# Patient Record
Sex: MALE | Race: WHITE | Employment: FULL TIME | URBAN - METROPOLITAN AREA
[De-identification: names, ages, dates, MRNs, and addresses within clinical notes are randomized per-mention and may not be internally consistent; named-entity substitution may affect disease eponyms.]

---

## 2018-01-09 ENCOUNTER — HOSPITAL ENCOUNTER (OUTPATIENT)
Dept: SLEEP MEDICINE | Age: 43
Discharge: HOME | End: 2018-01-09
Payer: COMMERCIAL

## 2018-01-09 PROCEDURE — 95806 SLEEP STUDY UNATT&RESP EFFT: CPT

## 2018-02-05 ENCOUNTER — HOSPITAL ENCOUNTER (OUTPATIENT)
Dept: SLEEP MEDICINE | Age: 43
Discharge: HOME | End: 2018-02-05
Payer: COMMERCIAL

## 2018-02-05 PROCEDURE — 95811 POLYSOM 6/>YRS CPAP 4/> PARM: CPT

## 2020-02-10 RX ORDER — SODIUM CHLORIDE 0.9 % (FLUSH) 0.9 %
5-40 SYRINGE (ML) INJECTION AS NEEDED
Status: CANCELLED | OUTPATIENT
Start: 2020-02-10

## 2020-02-10 RX ORDER — SODIUM CHLORIDE 0.9 % (FLUSH) 0.9 %
5-40 SYRINGE (ML) INJECTION EVERY 8 HOURS
Status: CANCELLED | OUTPATIENT
Start: 2020-02-10

## 2020-02-26 ENCOUNTER — ANESTHESIA EVENT (OUTPATIENT)
Dept: SURGERY | Age: 45
End: 2020-02-26
Payer: COMMERCIAL

## 2020-02-27 ENCOUNTER — ANESTHESIA (OUTPATIENT)
Dept: SURGERY | Age: 45
End: 2020-02-27
Payer: COMMERCIAL

## 2020-02-27 ENCOUNTER — HOSPITAL ENCOUNTER (OUTPATIENT)
Age: 45
Setting detail: OUTPATIENT SURGERY
Discharge: HOME | End: 2020-02-27
Attending: ORTHOPAEDIC SURGERY | Admitting: ORTHOPAEDIC SURGERY
Payer: COMMERCIAL

## 2020-02-27 VITALS
DIASTOLIC BLOOD PRESSURE: 82 MMHG | HEART RATE: 68 BPM | BODY MASS INDEX: 31.1 KG/M2 | TEMPERATURE: 98 F | RESPIRATION RATE: 16 BRPM | SYSTOLIC BLOOD PRESSURE: 113 MMHG | OXYGEN SATURATION: 97 % | WEIGHT: 223 LBS

## 2020-02-27 PROCEDURE — 76060000032 HC ANESTHESIA 0.5 TO 1 HR: Performed by: ORTHOPAEDIC SURGERY

## 2020-02-27 PROCEDURE — 77030000032 HC CUF TRNQT ZIMM -B: Performed by: ORTHOPAEDIC SURGERY

## 2020-02-27 PROCEDURE — 77030002986 HC SUT PROL J&J -A: Performed by: ORTHOPAEDIC SURGERY

## 2020-02-27 PROCEDURE — 76210000020 HC REC RM PH II FIRST 0.5 HR: Performed by: ORTHOPAEDIC SURGERY

## 2020-02-27 PROCEDURE — 74011250636 HC RX REV CODE- 250/636: Performed by: ANESTHESIOLOGY

## 2020-02-27 PROCEDURE — 77030010507 HC ADH SKN DERMBND J&J -B: Performed by: ORTHOPAEDIC SURGERY

## 2020-02-27 PROCEDURE — 74011000250 HC RX REV CODE- 250: Performed by: NURSE ANESTHETIST, CERTIFIED REGISTERED

## 2020-02-27 PROCEDURE — 76210000063 HC OR PH I REC FIRST 0.5 HR: Performed by: ORTHOPAEDIC SURGERY

## 2020-02-27 PROCEDURE — 76010000154 HC OR TIME FIRST 0.5 HR: Performed by: ORTHOPAEDIC SURGERY

## 2020-02-27 PROCEDURE — 74011250636 HC RX REV CODE- 250/636: Performed by: ORTHOPAEDIC SURGERY

## 2020-02-27 PROCEDURE — 77030006603 HC BLD CRPL ENDOSC S&N -B: Performed by: ORTHOPAEDIC SURGERY

## 2020-02-27 PROCEDURE — 77030018836 HC SOL IRR NACL ICUM -A: Performed by: ORTHOPAEDIC SURGERY

## 2020-02-27 PROCEDURE — 74011250636 HC RX REV CODE- 250/636: Performed by: NURSE ANESTHETIST, CERTIFIED REGISTERED

## 2020-02-27 PROCEDURE — 74011000250 HC RX REV CODE- 250: Performed by: ORTHOPAEDIC SURGERY

## 2020-02-27 RX ORDER — LIDOCAINE HYDROCHLORIDE 10 MG/ML
INJECTION INFILTRATION; PERINEURAL AS NEEDED
Status: DISCONTINUED | OUTPATIENT
Start: 2020-02-27 | End: 2020-02-27 | Stop reason: HOSPADM

## 2020-02-27 RX ORDER — LIDOCAINE HYDROCHLORIDE 10 MG/ML
0.1 INJECTION INFILTRATION; PERINEURAL AS NEEDED
Status: DISCONTINUED | OUTPATIENT
Start: 2020-02-27 | End: 2020-02-27 | Stop reason: HOSPADM

## 2020-02-27 RX ORDER — LIDOCAINE HYDROCHLORIDE 20 MG/ML
INJECTION, SOLUTION EPIDURAL; INFILTRATION; INTRACAUDAL; PERINEURAL AS NEEDED
Status: DISCONTINUED | OUTPATIENT
Start: 2020-02-27 | End: 2020-02-27 | Stop reason: HOSPADM

## 2020-02-27 RX ORDER — SODIUM CHLORIDE, SODIUM LACTATE, POTASSIUM CHLORIDE, CALCIUM CHLORIDE 600; 310; 30; 20 MG/100ML; MG/100ML; MG/100ML; MG/100ML
75 INJECTION, SOLUTION INTRAVENOUS CONTINUOUS
Status: DISCONTINUED | OUTPATIENT
Start: 2020-02-27 | End: 2020-02-27 | Stop reason: HOSPADM

## 2020-02-27 RX ORDER — OXYCODONE HYDROCHLORIDE 5 MG/1
5 TABLET ORAL
Status: DISCONTINUED | OUTPATIENT
Start: 2020-02-27 | End: 2020-02-27 | Stop reason: HOSPADM

## 2020-02-27 RX ORDER — SODIUM CHLORIDE 0.9 % (FLUSH) 0.9 %
5-40 SYRINGE (ML) INJECTION EVERY 8 HOURS
Status: DISCONTINUED | OUTPATIENT
Start: 2020-02-27 | End: 2020-02-27 | Stop reason: HOSPADM

## 2020-02-27 RX ORDER — PROPOFOL 10 MG/ML
INJECTION, EMULSION INTRAVENOUS
Status: DISCONTINUED | OUTPATIENT
Start: 2020-02-27 | End: 2020-02-27 | Stop reason: HOSPADM

## 2020-02-27 RX ORDER — FENTANYL CITRATE 50 UG/ML
100 INJECTION, SOLUTION INTRAMUSCULAR; INTRAVENOUS ONCE
Status: DISCONTINUED | OUTPATIENT
Start: 2020-02-27 | End: 2020-02-27 | Stop reason: HOSPADM

## 2020-02-27 RX ORDER — CEFAZOLIN SODIUM/WATER 2 G/20 ML
2 SYRINGE (ML) INTRAVENOUS ONCE
Status: COMPLETED | OUTPATIENT
Start: 2020-02-27 | End: 2020-02-27

## 2020-02-27 RX ORDER — CELECOXIB 200 MG/1
200 CAPSULE ORAL
Status: DISCONTINUED | OUTPATIENT
Start: 2020-02-27 | End: 2020-02-27 | Stop reason: HOSPADM

## 2020-02-27 RX ORDER — MIDAZOLAM HYDROCHLORIDE 1 MG/ML
2 INJECTION, SOLUTION INTRAMUSCULAR; INTRAVENOUS ONCE
Status: DISCONTINUED | OUTPATIENT
Start: 2020-02-27 | End: 2020-02-27 | Stop reason: HOSPADM

## 2020-02-27 RX ORDER — SODIUM CHLORIDE 0.9 % (FLUSH) 0.9 %
5-40 SYRINGE (ML) INJECTION AS NEEDED
Status: DISCONTINUED | OUTPATIENT
Start: 2020-02-27 | End: 2020-02-27 | Stop reason: HOSPADM

## 2020-02-27 RX ORDER — HYDROMORPHONE HYDROCHLORIDE 2 MG/ML
0.5 INJECTION, SOLUTION INTRAMUSCULAR; INTRAVENOUS; SUBCUTANEOUS
Status: DISCONTINUED | OUTPATIENT
Start: 2020-02-27 | End: 2020-02-27 | Stop reason: HOSPADM

## 2020-02-27 RX ORDER — MIDAZOLAM HYDROCHLORIDE 1 MG/ML
2 INJECTION, SOLUTION INTRAMUSCULAR; INTRAVENOUS
Status: DISCONTINUED | OUTPATIENT
Start: 2020-02-27 | End: 2020-02-27 | Stop reason: HOSPADM

## 2020-02-27 RX ORDER — ALBUTEROL SULFATE 0.83 MG/ML
2.5 SOLUTION RESPIRATORY (INHALATION) AS NEEDED
Status: DISCONTINUED | OUTPATIENT
Start: 2020-02-27 | End: 2020-02-27 | Stop reason: HOSPADM

## 2020-02-27 RX ORDER — BUPIVACAINE HYDROCHLORIDE 5 MG/ML
INJECTION, SOLUTION EPIDURAL; INTRACAUDAL AS NEEDED
Status: DISCONTINUED | OUTPATIENT
Start: 2020-02-27 | End: 2020-02-27 | Stop reason: HOSPADM

## 2020-02-27 RX ORDER — SODIUM CHLORIDE, SODIUM LACTATE, POTASSIUM CHLORIDE, CALCIUM CHLORIDE 600; 310; 30; 20 MG/100ML; MG/100ML; MG/100ML; MG/100ML
50 INJECTION, SOLUTION INTRAVENOUS CONTINUOUS
Status: DISCONTINUED | OUTPATIENT
Start: 2020-02-27 | End: 2020-02-27 | Stop reason: HOSPADM

## 2020-02-27 RX ADMIN — SODIUM CHLORIDE, SODIUM LACTATE, POTASSIUM CHLORIDE, AND CALCIUM CHLORIDE 75 ML/HR: 600; 310; 30; 20 INJECTION, SOLUTION INTRAVENOUS at 07:36

## 2020-02-27 RX ADMIN — Medication 2 G: at 08:15

## 2020-02-27 RX ADMIN — PROPOFOL 200 MCG/KG/MIN: 10 INJECTION, EMULSION INTRAVENOUS at 08:12

## 2020-02-27 RX ADMIN — LIDOCAINE HYDROCHLORIDE 40 MG: 20 INJECTION, SOLUTION EPIDURAL; INFILTRATION; INTRACAUDAL; PERINEURAL at 08:11

## 2020-02-27 NOTE — BRIEF OP NOTE
BRIEF OPERATIVE NOTE    Date of Procedure: 2/27/2020   Preoperative Diagnosis: Carpal tunnel syndrome, right upper limb [G56.01]  Postoperative Diagnosis: Carpal tunnel syndrome, right upper limb      Procedure(s):  RIGHT  CARPAL TUNNEL RELEASE ENDOSCOPIC  Surgeon(s) and Role:     * Nikolas Baker MD - Primary         Surgical Assistant: KIAN    Surgical Staff:  Circ-1: Rob Miguel RN  Scrub Tech-1: Maria Elena Gutiérrez  Scrub Tech-2: Zehra Taylor  Event Time In Time Out   Incision Start 8451    Incision Close 4485      Anesthesia: MAC   Estimated Blood Loss: MINIMAL  Specimens: * No specimens in log *   Findings: SEE DICTATION   Complications: NONE  Implants: * No implants in log *

## 2020-02-27 NOTE — ANESTHESIA PREPROCEDURE EVALUATION
Relevant Problems   No relevant active problems       Anesthetic History   No history of anesthetic complications            Review of Systems / Medical History  Patient summary reviewed, nursing notes reviewed and pertinent labs reviewed    Pulmonary        Sleep apnea: CPAP           Neuro/Psych   Within defined limits           Cardiovascular    Hypertension: well controlled          Hyperlipidemia    Exercise tolerance: >4 METS     GI/Hepatic/Renal     GERD: well controlled           Endo/Other        Obesity     Other Findings            Physical Exam    Airway  Mallampati: II      Mouth opening: Normal     Cardiovascular  Regular rate and rhythm,  S1 and S2 normal,  no murmur, click, rub, or gallop             Dental  No notable dental hx       Pulmonary  Breath sounds clear to auscultation               Abdominal         Other Findings            Anesthetic Plan    ASA: 2  Anesthesia type: total IV anesthesia          Induction: Intravenous  Anesthetic plan and risks discussed with: Patient      Discussed TIVA with its benefits (lower risk of nausea and sore throat, etc.) and risks including possible awareness, patient understands and elects to proceed

## 2020-02-27 NOTE — DISCHARGE INSTRUCTIONS
Keep dressing clean, dry and intact until post op day number 2-3. Then may shower, pat dry and keep covered until follow up. Do not scrub incision or submerge under water. Move fingers, elevate, and ice to prevent swelling. No heavy lifting. DIET  · Clear liquids until no nausea or vomiting; then light diet for the first day. · Advance to regular diet on second day, unless your doctor orders otherwise. · If nausea and vomiting continues, call your doctor. PAIN  · Take pain medication as directed by your doctor. · Call your doctor if pain is NOT relieved by medication. · DO NOT take aspirin of blood thinners unless directed by your doctor. CALL YOUR DOCTOR IF   · Excessive bleeding that does not stop after holding pressure over the area  · Temperature of 101 degrees F or above  · Excessive redness, swelling or bruising, and/ or green or yellow, smelly discharge from incision    AFTER ANESTHESIA   · For the first 24 hours: DO NOT Drive, Drink alcoholic beverages, or Make important decisions. · Be aware of dizziness following anesthesia and while taking pain medication. After general anesthesia or intravenous sedation, for 24 hours or while taking prescription Narcotics:  · Limit your activities  · A responsible adult needs to be with you for the next 24 hours  · Do not drive and operate hazardous machinery  · Do not make important personal or business decisions  · Do  not drink alcoholic beverages  · If you have not urinated within 8 hours after discharge, please contact your surgeon on call. · If you have sleep apnea and have a CPAP machine, please use it for all naps and sleeping. · Please use caution when taking narcotics and any of your home medications that may cause drowsiness. *  Please give a list of your current medications to your Primary Care Provider.   *  Please update this list whenever your medications are discontinued, doses are      changed, or new medications (including over-the-counter products) are added. *  Please carry medication information at all times in case of emergency situations. These are general instructions for a healthy lifestyle:  No smoking/ No tobacco products/ Avoid exposure to second hand smoke  Surgeon General's Warning:  Quitting smoking now greatly reduces serious risk to your health. Obesity, smoking, and sedentary lifestyle greatly increases your risk for illness  A healthy diet, regular physical exercise & weight monitoring are important for maintaining a healthy lifestyle    You may be retaining fluid if you have a history of heart failure or if you experience any of the following symptoms:  Weight gain of 3 pounds or more overnight or 5 pounds in a week, increased swelling in our hands or feet or shortness of breath while lying flat in bed. Please call your doctor as soon as you notice any of these symptoms; do not wait until your next office visit.

## 2020-02-27 NOTE — H&P
Outpatient Surgery History and Physical:  Deshaun Gordillo was seen and examined. CHIEF COMPLAINT:   Right carpal tunnel. PE:     Visit Vitals  /89 (BP 1 Location: Right arm, BP Patient Position: Sitting)   Pulse 82   Temp 97.9 °F (36.6 °C)   Resp 18   Wt 101.2 kg (223 lb)   SpO2 96%   BMI 31.10 kg/m²       Heart:   Regular rhythm      Lungs:  Are clear      Past Medical History:    Patient Active Problem List    Diagnosis    Low serum testosterone level    Decreased libido    ED (erectile dysfunction)    High frequency hearing loss    HTN (hypertension)    Hyperlipidemia       Surgical History:   Past Surgical History:   Procedure Laterality Date    HX ENDOSCOPY      HX OTHER SURGICAL      mass removed-lt breast- benign    HX VASECTOMY         Social History: Patient  reports that he has quit smoking. He has quit using smokeless tobacco. He reports current alcohol use. He reports that he does not use drugs. Family History:   Family History   Problem Relation Age of Onset    Hypertension Father        Allergies: Reviewed per EMR  No Known Allergies    Medications:    No current facility-administered medications on file prior to encounter. Current Outpatient Medications on File Prior to Encounter   Medication Sig    lisinopril (PRINIVIL, ZESTRIL) 20 mg tablet TAKE 1 TAB BY MOUTH DAILY.  atorvastatin (LIPITOR) 40 mg tablet TAKE 1 TAB BY MOUTH DAILY.  pantoprazole (PROTONIX) 40 mg tablet Take 40 mg by mouth daily.  cholecalciferol, vitamin D3, (VITAMIN D3) 2,000 unit Tab Take  by mouth. The surgery is planned for the right endoscopic carpal tunnel release. History and physical has been reviewed. The patient has been examined. There have been no significant clinical changes since the completion of the originally dated History and Physical.      The patient is here today for outpatient surgery.  I have examined the patient, no changes are noted in the patient's medical status. Necessity for the procedure/care is still present and the history and physical above is current. See the office notes for the full long term history of the problem. Please see the recent office notes for the musculoskeletal examination.     Signed By: Glenis Bowden MD     February 27, 2020 7:33 AM

## 2020-02-27 NOTE — ANESTHESIA POSTPROCEDURE EVALUATION
Procedure(s):  RIGHT  CARPAL TUNNEL RELEASE ENDOSCOPIC.    total IV anesthesia    Anesthesia Post Evaluation      Multimodal analgesia: multimodal analgesia used between 6 hours prior to anesthesia start to PACU discharge  Patient location during evaluation: PACU  Patient participation: complete - patient participated  Level of consciousness: awake  Pain management: adequate  Airway patency: patent  Anesthetic complications: no  Cardiovascular status: acceptable  Respiratory status: acceptable, spontaneous ventilation and nonlabored ventilation  Hydration status: acceptable  Post anesthesia nausea and vomiting:  none      Vitals Value Taken Time   /72 2/27/2020  8:48 AM   Temp 36.9 °C (98.5 °F) 2/27/2020  8:39 AM   Pulse 74 2/27/2020  8:51 AM   Resp 16 2/27/2020  8:48 AM   SpO2 96 % 2/27/2020  8:51 AM   Vitals shown include unvalidated device data.

## 2020-02-27 NOTE — PROGRESS NOTES
's Pre-op visit requested by patient. Conveyed care and concern for patient and family. Offered prayer as requested for patient, family, and staff.     Fide Huff MDiv, BS  Board Certified

## 2020-02-27 NOTE — OP NOTES
Carpal Tunnel Release Operative Report       2/27/20  Preoperative diagnosis:  Carpal tunnel syndrome, right upper limb [G56.01]    Postoperative diagnosis: Carpal tunnel syndrome, right upper limb      Surgeon(s) and Role:     * Nilay Woodson MD - Primary     Anesthesia: MAC Local with MAC. Procedures: Procedure(s):  RIGHT  CARPAL TUNNEL RELEASE ENDOSCOPIC     EBL/IV FLUIDS: Per Anesthesia. COMPLICATIONS: None. DISPOSITION: Stable to recovery room. INDICATIONS FOR PROCEDURE: The patient is a pleasant 43-year-old male with history of right carpal tunnel syndrome that has failed nonoperative measures. It was confirmed on preoperative nerve studies. After both operative and   nonoperative treatment options were discussed, the decision was made to go ahead with a right endoscopic carpal tunnel release. Risks and benefits of the procedure were discussed including, but not limited to bleeding, infection, injury to adjacent structures consisting of tendon, artery, and nerve, need for additional procedures, wound dehiscence, scar formation, incomplete resolution of symptoms, recurrence of symptoms, and transient neuropraxia. Informed consent was obtained. PROCEDURE IN DETAIL: The patient was seen and marked in the preoperative suite. The patient was taken back to the OR, placed on the table in supine position with right upper extremities on hand tables. Right upper extremities were prepped and draped in standard sterile fashion. A formal timeout was performed confirming patient identification, preoperative antibiotics, and planned operative procedure. We infiltrated both planned incision sites with lidocaine and Marcaine, exsanguinated the right upper extremity and the tourniquet was placed up to 250 mmHg. A standard proximal incision was made just proximal to the distal palmar crease and ulnar to palmaris longus. Dissection was performed bluntly with Ragnell retractors.   The antebrachial fascia was identified and incised longitudinally. The carpal tunnel was entered with a spatula, staying ulnar throughout the procedure just radial to the hook of hamate. The undersurface of the trasverse carpal ligament was carefully scraped to remove adhesions. We placed the trocar in the same manner, ulnarly, made our distal incision and fully seated the trochar. We were able to see the entire transverse carpal ligament without difficulty. Under direct vision and in a proximal and distal manner, we incised the transverse carpal ligament ulnarly. We saw adequate retraction of leaflets and distal fat confirming complete release. Instruments were removed. We irrigated copiously with normal saline. The proximal incisionwas closed with Prolene and Dermabond glue. The distal incision was closed with Dermabond glue. The tourniquet was let down, the fingers had brisk capillary refill and a soft sterile dressing was placed. POSTOPERATIVE CARE: Early motion. No heavy lifting.  Followup in 2 weeks for suture removal.    Closure: Primary    Complications: None     Signed By: Genie Bermudez MD

## 2020-04-16 ENCOUNTER — HOSPITAL ENCOUNTER (OUTPATIENT)
Dept: LAB | Age: 45
Discharge: HOME | End: 2020-04-16

## 2020-04-16 PROCEDURE — 88305 TISSUE EXAM BY PATHOLOGIST: CPT

## 2020-08-21 ENCOUNTER — HOSPITAL ENCOUNTER (OUTPATIENT)
Dept: ULTRASOUND IMAGING | Age: 45
Discharge: HOME | End: 2020-08-21
Attending: FAMILY MEDICINE
Payer: COMMERCIAL

## 2020-08-21 DIAGNOSIS — R22.1 NECK FULLNESS: ICD-10-CM

## 2020-08-21 PROCEDURE — 76536 US EXAM OF HEAD AND NECK: CPT

## 2020-08-21 NOTE — PROGRESS NOTES
There is a 0.5 cm suspicious nodule in the right thyroid lobe we need to refer the patient to an endocrinologist for further work-up.

## 2020-09-21 PROBLEM — E04.1 THYROID NODULE: Status: ACTIVE | Noted: 2020-09-21

## 2022-03-18 PROBLEM — E04.1 THYROID NODULE: Status: ACTIVE | Noted: 2020-09-21

## 2023-02-06 ENCOUNTER — INITIAL CONSULT (OUTPATIENT)
Dept: CARDIOLOGY CLINIC | Age: 48
End: 2023-02-06
Payer: COMMERCIAL

## 2023-02-06 VITALS
WEIGHT: 217 LBS | HEIGHT: 71 IN | BODY MASS INDEX: 30.38 KG/M2 | SYSTOLIC BLOOD PRESSURE: 168 MMHG | HEART RATE: 72 BPM | DIASTOLIC BLOOD PRESSURE: 90 MMHG

## 2023-02-06 DIAGNOSIS — I10 PRIMARY HYPERTENSION: ICD-10-CM

## 2023-02-06 DIAGNOSIS — I20.9 ANGINA PECTORIS (HCC): Primary | ICD-10-CM

## 2023-02-06 PROBLEM — I20.0 ACCELERATING ANGINA (HCC): Status: ACTIVE | Noted: 2023-02-06

## 2023-02-06 PROCEDURE — 99203 OFFICE O/P NEW LOW 30 MIN: CPT | Performed by: INTERNAL MEDICINE

## 2023-02-06 PROCEDURE — 93000 ELECTROCARDIOGRAM COMPLETE: CPT | Performed by: INTERNAL MEDICINE

## 2023-02-06 PROCEDURE — 3080F DIAST BP >= 90 MM HG: CPT | Performed by: INTERNAL MEDICINE

## 2023-02-06 PROCEDURE — 3077F SYST BP >= 140 MM HG: CPT | Performed by: INTERNAL MEDICINE

## 2023-02-06 RX ORDER — BISOPROLOL FUMARATE AND HYDROCHLOROTHIAZIDE 5; 6.25 MG/1; MG/1
1 TABLET ORAL DAILY
Qty: 30 TABLET | Refills: 3 | Status: SHIPPED | OUTPATIENT
Start: 2023-02-06

## 2023-02-06 NOTE — PROGRESS NOTES
[facility] CARDIOLOGY  [address], [address] [facility] ([place], [place]), [address] [place] [address]  PHONE: [phone]        23        NAME:  Helena Caba  : 1975  MRN: [id number]     CHIEF COMPLAINT:    Consultation and Hypertension         SUBJECTIVE:     48 yo male referred for chest pain. \"Tightness\" substernal with exertion 1 year no change. Phx:  Htn  Inc cholesterol    Fhx:  Father 76 a+w  Mother 79 a+w  1 sister  no cad    Shx:  . Facility maintenance. Non smoker. Ros:  Hearing aides both ears. Medications were all reviewed with the patient today and updated as necessary. Current Outpatient Medications   Medication Sig    bisoprolol-hydroCHLOROthiazide (ZIAC) 5-6.25 MG per tablet Take 1 tablet by mouth daily    atorvastatin (LIPITOR) 40 MG tablet TAKE 1 TAB BY MOUTH DAILY. Cholecalciferol 50 MCG (2000) TABS Take by mouth    lisinopril (PRINIVIL;ZESTRIL) 20 MG tablet TAKE 1 TAB BY MOUTH DAILY. pantoprazole (PROTONIX) 40 MG tablet Take 40 mg by mouth daily     No current facility-administered medications for this visit. No Known Allergies        PHYSICAL EXAM:     Wt Readings from Last 3 Encounters:   23 217 lb (98.4 kg)   21 205 lb (93 kg)     BP Readings from Last 3 Encounters:   23 (!) 168/90   21 102/70       BP (!) 168/90   Pulse 72   Ht 5' 11\" (1.803 m)   Wt 217 lb (98.4 kg)   BMI 30.27 kg/m²     Physical Exam  Vitals reviewed. HENT:      Head: Normocephalic and atraumatic. Eyes:      Extraocular Movements: Extraocular movements intact. Pupils: Pupils are equal, round, and reactive to light. Cardiovascular:      Rate and Rhythm: Normal rate. Heart sounds: Normal heart sounds. Pulmonary:      Effort: Pulmonary effort is normal.      Breath sounds: Normal breath sounds. Abdominal:      General: Abdomen is flat. Palpations: Abdomen is soft. There is no mass.    Musculoskeletal:         General: Normal range of motion. Cervical back: Normal range of motion. Skin:     General: Skin is warm and dry. Neurological:      General: No focal deficit present. Mental Status: He is alert and oriented to person, place, and time. Psychiatric:         Mood and Affect: Mood normal.         RECENT LABS AND RECORDS REVIEW      T chol  150  [address]    ASSESSMENT and PLAN    Jeny Moore was seen today for consultation and hypertension. Diagnoses and all orders for this visit:    Angina pectoris ([address]  -     bisoprolol-hydroCHLOROthiazide (ZIAC) 5-6.25 MG per tablet; Take 1 tablet by mouth daily  -     Case Request Cardiac Cath Lab    Primary hypertension  -     Cancel: EKG 12 lead; Future  -     EKG 12 lead  -     bisoprolol-hydroCHLOROthiazide (ZIAC) 5-6.25 MG per tablet; Take 1 tablet by mouth daily     Return for will schedule after procedure.        Gregg Rico MD  2/6/2023  12:20 PM

## 2023-02-07 NOTE — PROGRESS NOTES
Patient pre-assessment complete for [facility] scheduled for 23, arrival time 1030. Patient verified using . Patient instructed to bring a list of all home medications on the day of procedure. NPO status reinforced. Patient informed to take a full dose aspirin 325mg  or 81 mg x 4 on the day of procedure. Patient instructed to [facility] they can take all other medications excluding vitamins & supplements. Patient verbalizes understanding of all instructions & denies any questions at this time.

## 2023-02-08 ENCOUNTER — HOSPITAL ENCOUNTER (OUTPATIENT)
Age: 48
Setting detail: OUTPATIENT SURGERY
Discharge: HOME | End: 2023-02-08
Attending: INTERNAL MEDICINE | Admitting: INTERNAL MEDICINE
Payer: COMMERCIAL

## 2023-02-08 VITALS
BODY MASS INDEX: 30.38 KG/M2 | RESPIRATION RATE: 12 BRPM | TEMPERATURE: 97.5 F | DIASTOLIC BLOOD PRESSURE: 72 MMHG | HEIGHT: 71 IN | WEIGHT: 217 LBS | OXYGEN SATURATION: 97 % | SYSTOLIC BLOOD PRESSURE: 119 MMHG | HEART RATE: 66 BPM

## 2023-02-08 DIAGNOSIS — I20.0 ACCELERATING ANGINA (HCC): ICD-10-CM

## 2023-02-08 LAB
ANION GAP SERPL CALC-SCNC: 5 MMOL/L (ref 2–11)
BUN SERPL-MCNC: 10 MG/DL (ref 6–23)
CALCIUM SERPL-MCNC: 9.1 MG/DL (ref 8.3–10.4)
CHLORIDE SERPL-SCNC: 105 MMOL/L (ref 101–110)
CO2 SERPL-SCNC: 26 MMOL/L (ref 21–32)
CREAT SERPL-MCNC: 1.1 MG/DL (ref 0.8–1.5)
ECHO BSA: 2.22 M2
EKG ATRIAL RATE: 60 BPM
EKG DIAGNOSIS: NORMAL
EKG P AXIS: 53 DEGREES
EKG P-R INTERVAL: 178 MS
EKG Q-T INTERVAL: 426 MS
EKG QRS DURATION: 90 MS
EKG QTC CALCULATION (BAZETT): 426 MS
EKG R AXIS: 47 DEGREES
EKG T AXIS: 46 DEGREES
EKG VENTRICULAR RATE: 60 BPM
ERYTHROCYTE [DISTWIDTH] IN BLOOD BY AUTOMATED COUNT: 13 % (ref 11.9–14.6)
GLUCOSE SERPL-MCNC: 114 MG/DL (ref 65–100)
HCT VFR BLD AUTO: 42.5 % (ref 41.1–50.3)
HGB BLD-MCNC: 15.2 G/DL (ref 13.6–17.2)
MCH RBC QN AUTO: 31.5 PG (ref 26.1–32.9)
MCHC RBC AUTO-ENTMCNC: 35.8 G/DL (ref 31.4–35)
MCV RBC AUTO: 88.2 FL (ref 82–102)
NRBC # BLD: 0 K/UL (ref 0–0.2)
PLATELET # BLD AUTO: 207 K/UL (ref 150–450)
PMV BLD AUTO: 10.1 FL (ref 9.4–12.3)
POTASSIUM SERPL-SCNC: 3.9 MMOL/L (ref 3.5–5.1)
RBC # BLD AUTO: 4.82 M/UL (ref 4.23–5.6)
SODIUM SERPL-SCNC: 136 MMOL/L (ref 133–143)
WBC # BLD AUTO: 8.4 K/UL (ref 4.3–11.1)

## 2023-02-08 PROCEDURE — 2500000003 HC RX 250 WO HCPCS: Performed by: INTERNAL MEDICINE

## 2023-02-08 PROCEDURE — C1769 GUIDE WIRE: HCPCS | Performed by: INTERNAL MEDICINE

## 2023-02-08 PROCEDURE — 6360000004 HC RX CONTRAST MEDICATION: Performed by: INTERNAL MEDICINE

## 2023-02-08 PROCEDURE — 80048 BASIC METABOLIC PNL TOTAL CA: CPT

## 2023-02-08 PROCEDURE — 99152 MOD SED SAME PHYS/QHP 5/>YRS: CPT | Performed by: INTERNAL MEDICINE

## 2023-02-08 PROCEDURE — 93458 L HRT ARTERY/VENTRICLE ANGIO: CPT | Performed by: INTERNAL MEDICINE

## 2023-02-08 PROCEDURE — 85027 COMPLETE CBC AUTOMATED: CPT

## 2023-02-08 PROCEDURE — C1894 INTRO/SHEATH, NON-LASER: HCPCS | Performed by: INTERNAL MEDICINE

## 2023-02-08 PROCEDURE — 2709999900 HC NON-CHARGEABLE SUPPLY: Performed by: INTERNAL MEDICINE

## 2023-02-08 PROCEDURE — 6360000002 HC RX W HCPCS: Performed by: INTERNAL MEDICINE

## 2023-02-08 PROCEDURE — 93005 ELECTROCARDIOGRAM TRACING: CPT | Performed by: INTERNAL MEDICINE

## 2023-02-08 PROCEDURE — 2580000003 HC RX 258: Performed by: INTERNAL MEDICINE

## 2023-02-08 RX ORDER — SODIUM CHLORIDE 9 MG/ML
INJECTION, SOLUTION INTRAVENOUS CONTINUOUS
Status: DISCONTINUED | OUTPATIENT
Start: 2023-02-08 | End: 2023-02-08 | Stop reason: HOSPADM

## 2023-02-08 RX ORDER — LIDOCAINE HYDROCHLORIDE 10 MG/ML
INJECTION, SOLUTION INFILTRATION; PERINEURAL PRN
Status: DISCONTINUED | OUTPATIENT
Start: 2023-02-08 | End: 2023-02-08 | Stop reason: HOSPADM

## 2023-02-08 RX ORDER — ASPIRIN 81 MG/1
324 TABLET, CHEWABLE ORAL DAILY
Status: DISCONTINUED | OUTPATIENT
Start: 2023-02-08 | End: 2023-02-08 | Stop reason: HOSPADM

## 2023-02-08 RX ORDER — HEPARIN SODIUM 200 [USP'U]/100ML
INJECTION, SOLUTION INTRAVENOUS CONTINUOUS PRN
Status: COMPLETED | OUTPATIENT
Start: 2023-02-08 | End: 2023-02-08

## 2023-02-08 RX ORDER — MIDAZOLAM HYDROCHLORIDE 1 MG/ML
INJECTION INTRAMUSCULAR; INTRAVENOUS PRN
Status: DISCONTINUED | OUTPATIENT
Start: 2023-02-08 | End: 2023-02-08 | Stop reason: HOSPADM

## 2023-02-08 RX ADMIN — SODIUM CHLORIDE: 900 INJECTION, SOLUTION INTRAVENOUS at 10:34

## 2023-02-08 NOTE — PROGRESS NOTES
Patient received to [address] [place] [address] room # 12  Ambulatory from [facility]. Patient scheduled for C today with Dr Horace Nguyen. Procedure reviewed & questions answered, voiced good understanding consent obtained & placed on chart. All medications and medical history reviewed. Will prep patient per orders. Patient & family updated on plan of care. The patient has a fraility score of 3-MANAGING WELL, based on ambulation without assistance  .    Patient took Aspirin 324mg today at 0700 prior to arrival.

## 2023-02-08 NOTE — Clinical Note
Prepped: right groin and right radial. Site was clipped and prepped. Prepped with: ChloraPrep. Wet prep solution applied at: 2/8/2023 3:00 AM. Patient was draped after wet prep solution dried.

## 2023-02-08 NOTE — DISCHARGE INSTRUCTIONS
HEART CATHETERIZATION/ANGIOGRAPHY DISCHARGE INSTRUCTIONS                  * Please call [facility] Cardiology [phone] if follow up appointment isn't scheduled in [facility] in the next 3-5 business days. *  Check puncture site frequently for swelling or bleeding. If there is any bleeding, apply pressure over the area with a clean towel or washcloth and call 911. Notify your doctor for any redness, swelling, drainage, or oozing from the puncture site. Notify your doctor for any fever or chills. If the extremity becomes cold, numb, or painful call Dr. Ivis Mccarty at [phone]. Activity should be limited for the next 48 hours. No heavy lifting, pushing, pulling  or strenuous activity for 48 hours. No heavy lifting (anything over 10 pounds) for 3 days. You may resume your usual diet. Drink more fluids than usual.  Have a responsible person drive you home and stay with you for at least 24 hours after your heart catheterization/angiography. You may remove bandage from your Right wrist in 24 hours. You may shower in 24 hours. No tub baths, hot tubs, or swimming for 1 week. Do not place any lotions, creams, powders, or ointments over puncture site for 1 week. You may place a clean band-aid over the puncture site each day for 5 days. Change daily. Sedation for a Medical Procedure: Care Instructions     You were given a sedative medication during your visit. While many of the effects will have worn   off before you leave; you may continue to feel some effects for several hours. Common side effects from sedation include:  Feeling sleepy. (Your doctors and nurses will make sure you are not too sleepy to go home.)  Nausea and vomiting. This usually does not last long. Feeling tired. How can you care for yourself at home? Activity    Don't do anything for 24 hours that requires attention to detail. It takes time for the medicine effects to completely wear off.      Do not make important legal decisions for 24 hours. Do not sign any legal documents for 24 hours. Do not drink alcohol today     For your safety, you should not drive or operate heavy machinery for the remainder of the day     Rest when you feel tired. Getting enough sleep will help you recover. Diet    You can eat your normal diet, unless your doctor gives you other instructions. If your stomach is upset, try clear liquids and bland, low-fat foods like plain toast or rice. Drink plenty of fluids (unless your doctor tells you not to). Don't drink alcohol for 24 hours. Medicines    Be safe with medicines. Read and follow all instructions on the label. If the doctor gave you a prescription medicine for pain, take it as prescribed. If you are not taking a prescription pain medicine, ask your doctor if you can take an over-the-counter medicine. If you think your pain medicine is making you sick to your stomach: Take your medicine after meals (unless your doctor has told you not to). Ask your doctor for a different pain medicine. I have read the above instructions and have had the opportunity to ask questions.       Patient: ________________________   Date: _____________    Witness: _______________________   Date: _____________

## 2023-02-08 NOTE — PROGRESS NOTES
Discharge instructions given per orders, voiced good understanding of Right wrist cath site care, medications & follow up care. Denies any questions.

## 2023-02-08 NOTE — PROGRESS NOTES
Assisted OOB & ambulated to BR & on unit. Tolerated activity without difficulty.  Right wrist cath site without bleeding or hematoma

## 2023-02-08 NOTE — PROGRESS NOTES
TRANSFER - IN REPORT:    Verbal report received from [facility] on [facility] Cue  being received from [facility] for routine progression of patient care      Report consisted of patient's Situation, Background, Assessment and   Recommendations(SBAR). Information from the following report(s) Nurse Handoff Report was reviewed with the receiving nurse. [facility] with Dr Aleyda Duenas  No interventions  R Radial  TR band at 12mL  Versed 2mg  Heparin 5000 units     Opportunity for questions and clarification was provided. Assessment completed upon patient's arrival to unit and care assumed.

## 2023-02-08 NOTE — Clinical Note
Contrast: Isovue. Contrast concentration: 370. Amount: 80 mL. Physical Therapy  Visit Type: treatment  Precautions:  Medical precautions:  fall risk; standard precautions.   Patient is a 66 year old female presenting for weakness, leg swelling and shortness of breath  Patient seen in 2N nursing unit.  Lines:     Basic: telemetry and continuous pulse oximetry  Lower Extremity:    Left:  weight bearing: as tolerated.    Right:  weight bearing: as tolerated.  Safety Measures: chair alarm, bed alarm and bed rails      SUBJECTIVE                                                                                                            Patient agreed to participate in therapy this date.  Reported feeling very tired from lack of sleep.  Declined mobility training but agreeable to exercises.  Patient / Family Goal: maximize function  Pain     Location: R knee and groin pain not rated by pt      OBJECTIVE                                                                                                                Oriented to person, place, time and situation     Affect/Behavior: alert, appropriate, calm and cooperative  Bed Mobility:      Supine to sit: patient refused        Interventions                                                                                                       Supine    Lower Extremity: Bilateral: heel slides, ankle pumps, hip abduction/adduction and quad sets (Blue sheet under legs, rest breaks.  Falling asleep during session.), AROM, 10 reps, 1 sets    Rehab Safety  Therapist donned the following PPE for this patient encounter:  Gloves, Surgical Mask and Goggles    Therapy aide or other healthcare professional present during this patient encounter:   No  If yes, that healthcare professional wore the following PPE: Not Applicable    The patient was wearing a mask during this session:  No    Therapist with patient for approx 15 minutes.  Skilled input: Verbal instruction/cues  Verbal Consent: Writer verbally educated and received verbal consent for hand  placement, positioning of patient, and techniques to be performed today from patient         ASSESSMENT                                                                                                                Impairments: strength, activity tolerance and pain  Functional Limitations: all functional mobility  Today's treatment focused on exercise.  The patient is demonstrating limited progress as evidenced by limited activity tolerance.           Discharge Recommendations    Recommendation for Discharge: PT IL: Patient needs daily, skilled therapy for 1-3 hours a day by at least two disciplines           PT/OT Mobility Equipment for Discharge: Pt owns a walker       Skilled therapy is required to address these limitations in attempt to maximize the patient's independence.  Progress: slow progress, decreased activity tolerance    Pain at end of session: , location: R knee and groin pain not rated by pt    End of Session:   Location: in bed  Safety measures: alarm system in place/re-engaged, bed rails x2, equipment intact and call light within reach  Handoff to: nurse            PLAN                                                                                                                            Suggestions for next session as indicated: Activity as tolerated    PT Frequency: 5 days/week       Interventions: strengthening  Agreement to plan and goals: patient agrees with goals and treatment plan        Goals Reviewed: 10/5/2020  GOALS:  Long Term Goals: (to be met by time of discharge from hospital)  Sit to stand: Patient will complete sit to stand transfer with 2-wheeled walker, modified independent.   Status: progressing/ongoing  Stand pivot: Patient will complete stand pivot transfer with 2-wheeled walker, modified independent.   Status: progressing/ongoing  Ambulation (even): Patient will ambulate on even surface for 200 feet with 2-wheeled walker, modified independent.   Status:  progressing/ongoing  3-4 steps: Patient will ambulate 3-4 steps with using 2 rails, modified independent.  Status: progressing/ongoing  Home program: patient independent with home program as instructed.   Status: progressing/ongoing      Documented in the chart in the following areas: Assessment.

## 2023-02-08 NOTE — Clinical Note
Contrast Dose Calculator:   Patient's age: 49.   Patient's sex: Male. Patient weight (kg) = 98.4. Creatinine level (mg/dL) = 1.1. Creatinine clearance (mL/min): 114. Contrast concentration (mg/mL) = 370. MACD = 300 mL. Max Contrast dose per Creatinine Cl calculator = 256.5 mL.

## 2023-02-08 NOTE — Clinical Note
Accessed site: right radial artery. Radial access needle used. 1 attempt(s) to obtain access. Access was successfully obtained.

## 2023-03-15 NOTE — PROGRESS NOTES
[address] [place] [address] Court, [address] [facility]  [phone]    Patient Name:  Dylan Larson  YOB: 1975      Office Visit 3/16/2023    CHIEF COMPLAINT:    Chief Complaint   Patient presents with    Sleep Apnea    New Patient     Reestablish care         HISTORY OF PRESENT ILLNESS:  Patient is a 50 yo male seen today to reestablish care. Pt had a PSG/HST on 1/9/18 with an AHI of 17.8/hr. Pt is on CPAP 7 cm H2O. Pt reports that he has been compliant with PAP therapy even though he has not been seen since 7/2019. He uses nasal pillows. He sleeps well and reports great energy during the day. He does request a new machine. His machine does not allow remote access. He wants to be able to have virtual visits as he travels frequently. He is eligible for a new machine. I will order an S11 machine. Pt has excellent compliance with use 364/365 days with an average use of 7 hrs and 5 mins per day. Pt has a mask leak of 0.6L/min with an AHI of 2/hr. Pt is benefiting and tolerating PAP therapy.    Sleep Medicine 3/16/2023   Sitting and reading 0   Watching TV 1   Sitting, inactive in a public place (e.g. 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    HTN (hypertension) 11/21/2012    Hyperlipidemia 11/21/2012    Low serum testosterone level 9/9/2013    Sleep apnea     uses a cpap         Patient Active Problem List   Diagnosis    Decreased libido    Thyroid nodule    HTN (hypertension)    High frequency hearing loss    Hyperlipidemia    Low serum testosterone level    ED (erectile dysfunction)    Accelerating angina [facility])          Past Surgical History:   Procedure Laterality Date    CARDIAC PROCEDURE N/A 2/8/2023    Left heart cath / coronary angiography performed by Fahad Lozano MD at [address] [place] [address] CATH LAB    OTHER SURGICAL HISTORY      mass removed-lt breast- benign    UPPER GASTROINTESTINAL ENDOSCOPY      VASECTOMY             Social History     Socioeconomic History    Marital status:      Spouse name: Not on file    Number of children: Not on file    Years of education: Not on file    Highest education level: Not on file   Occupational History    Not on file   Tobacco Use    Smoking status: Former    Smokeless tobacco: Former   Substance and Sexual Activity    Alcohol use: Yes    Drug use: No    Sexual activity: Not on file   Other Topics Concern    Not on file   Social History Narrative    Currently drinks 9 caffeinated beverages per day     Social Determinants of Health     Financial Resource Strain: Not on file   Food Insecurity: Not on file   Transportation Needs: Not on file   Physical Activity: Not on file   Stress: Not on file   Social Connections: Not on file   Intimate Partner Violence: Not on file   Housing Stability: Not on file         Family History   Problem Relation Age of Onset    Thyroid Cancer Neg Hx     Thyroid Disease Neg Hx     Hypertension Father          No Known Allergies      Current Outpatient Medications   Medication Sig    bisoprolol-hydroCHLOROthiazide (ZIAC) 5-6.25 MG per tablet Take 1 tablet by mouth daily    atorvastatin (LIPITOR) 40 MG tablet TAKE 1 TAB BY MOUTH DAILY. Cholecalciferol 50 MCG (2000 UT) TABS Take by mouth    lisinopril (PRINIVIL;ZESTRIL) 20 MG tablet TAKE 1 TAB BY MOUTH DAILY. pantoprazole (PROTONIX) 40 MG tablet Take 40 mg by mouth daily     No current facility-administered medications for this visit.            REVIEW OF SYSTEMS:   CONSTITUTIONAL:   There is no history of fever, chills, night sweats, weight loss, weight gain, persistent fatigue, or lethargy/hypersomnolence. CARDIAC:   No chest pain, pressure, discomfort, palpitations, orthopnea, murmurs, or edema. GI:   No dysphagia, heartburn reflux, nausea/vomiting, diarrhea, abdominal pain, or bleeding. NEURO:   There is no history of AMS, persistent headache, decreased level of consciousness, seizures, or motor or sensory deficits. PHYSICAL EXAM:    Vitals:    03/16/23 0755   BP: 118/80   Pulse: 84   Resp: 14   Temp: 97.2 °F (36.2 °C)   SpO2: 98%   Weight: 219 lb (99.3 kg)   Height: 5' 11\" (1.803 m)             GENERAL APPEARANCE:   The patient is normal weight and in no respiratory distress, on RA. HEENT:   PERRL. Conjunctivae unremarkable. Nasal mucosa is without epistaxis, exudate, or polyps. Gums and dentition are unremarkable. NECK/LYMPHATIC:   Symmetrical with no elevation of jugular venous pulsation. Trachea midline. No thyroid enlargement. No cervical adenopathy. LUNGS:   Normal respiratory effort with symmetrical lung expansion. Breath sounds clear. HEART:   There is a regular rate and rhythm. No murmur, rub, or gallop. There is no edema in the lower extremities. ABDOMEN:   Soft and non-tender. No hepatosplenomegaly. Bowel sounds are normal.     NEURO:   The patient is alert and oriented to person, place, and time. Memory appears intact and mood is normal.  No gross sensorimotor deficits are present. ASSESSMENT:  (Medical Decision Making)      Diagnosis Orders   1. ADRIENNE on CPAP -The pathophysiology of obstructive sleep apnea was reviewed with the patient. It's potential to promote severe neurologic, cardiac, pulmonary, and gastrointestinal problems was discussed.  Specifically, the increased incidence of hypertension, coronary artery disease, congestive heart failure, pulmonary hypertension, gastroesophageal reflux, pathologic hypersomnolence, memory loss, and glucose intolerance was related to the consequences of hypoxemia, hypercapnia, airway obstruction, and sympathetic overdrive. We also discussed the ability of nasal CPAP to correct these abnormalities through maintenance of a patent airway. Pt to continue PAP therapy. A new machine  was ordered. DME - DURABLE MEDICAL EQUIPMENT      2. Hypertension, unspecified type -controlled            PLAN:      Orders Placed This Encounter   Procedures    DME - [facility]  Phone: [phone]  [place] [address]  Dept: [phone]      Patient Name: Courtney Martinez  : 1975  Gender: male  Address: [address] [place] [id number]  Patient phone number: [phone] (home)       Primary Insurance: Payor: Trevor Osler / Plan: Randal Green / Product Type: *No Product type* /   Subscriber ID: [id number] - ([facility])      AMB Supply Order  Order Details     DME Location: [address] machine   Order Date: 3/16/2023   There were no encounter diagnoses.           (  X   )New Set-Up      machine   (     ) CPAP Unit  ( x    ) Auto CPAP Unit  (     ) BiLevel Unit  (     ) Auto BiLevel Unit  (     ) ASV   (     ) Bilevel ST    (     ) Oxygen Concentrator         Length of need: 12 months    Pressure: 7  cmH20  EPR: 1     Starting Ramp Pressure:  5 cm H20  Ramp Time: min  5    Patient had a diagnostic Apnea Hypopnea Index (AHI) of :  17.8    *SUPPLIES* Replace all as needed, or per coverage guidelines     Masks Type:    (  x   ) -Full Face Mask (1 per 3 mon)  ( x    ) -Full Mask (1 per month) Interface/Cushion      (     ) -Nasal Mask (1 per 3 mon)  (     ) - Nasal Mask (1 per month) Interface/Cushion  (     ) -Pillow (2 per mon)  (     ) -Qukwuirzo (1 per 6 mon)      _________________________________________________________________          Other Supplies:    (  X   )-Rtvfzipf (1 per 6 mon)  ( X    )-Jxwxmm Tubing (1 per 3 mon)  (  X   )- Disposable Filter (2 per mon)  (   X  )-Vchkvc Humidifier (1 per year)     (  x   )-Mzbjvckyu (sometimes used with Full Face Mask) (1 per 6 mos)  (     )-Tubing-without heat (1 per 3 mos)  ( X   )-Non-Disposable Filter (1 per 6 mos)  (   x  )-Water Chamber (1 per 6 mos)  (     )-Humidifier non-heated (1 per 5 yrs)      Signed Date: 3/16/2023  Electronically Signed By: NILTON Kaiser     No orders of the defined types were placed in this encounter. Collaborating Physician: Dr. Lala Bell    Over 50% of today's office visit was spent in face to face time reviewing test results, prognosis, importance of compliance, education about disease process, benefits of medications, instructions for management of acute flare-ups, and follow up plans. Total face to face time spent with patient was 31 minutes.         NILTON Kaiser  Electronically signed

## 2023-03-16 ENCOUNTER — OFFICE VISIT (OUTPATIENT)
Dept: SLEEP MEDICINE | Age: 48
End: 2023-03-16
Payer: COMMERCIAL

## 2023-03-16 VITALS
WEIGHT: 219 LBS | BODY MASS INDEX: 30.66 KG/M2 | OXYGEN SATURATION: 98 % | HEIGHT: 71 IN | TEMPERATURE: 97.2 F | SYSTOLIC BLOOD PRESSURE: 118 MMHG | RESPIRATION RATE: 14 BRPM | HEART RATE: 84 BPM | DIASTOLIC BLOOD PRESSURE: 80 MMHG

## 2023-03-16 DIAGNOSIS — Z99.89 OSA ON CPAP: Primary | ICD-10-CM

## 2023-03-16 DIAGNOSIS — I10 HYPERTENSION, UNSPECIFIED TYPE: ICD-10-CM

## 2023-03-16 DIAGNOSIS — G47.33 OSA ON CPAP: Primary | ICD-10-CM

## 2023-03-16 PROCEDURE — 3074F SYST BP LT 130 MM HG: CPT | Performed by: PHYSICIAN ASSISTANT

## 2023-03-16 PROCEDURE — 99203 OFFICE O/P NEW LOW 30 MIN: CPT | Performed by: PHYSICIAN ASSISTANT

## 2023-03-16 PROCEDURE — 3079F DIAST BP 80-89 MM HG: CPT | Performed by: PHYSICIAN ASSISTANT

## 2023-03-16 ASSESSMENT — SLEEP AND FATIGUE QUESTIONNAIRES
HOW LIKELY ARE YOU TO NOD OFF OR FALL ASLEEP IN A CAR, WHILE STOPPED FOR A FEW MINUTES IN TRAFFIC: 0
HOW LIKELY ARE YOU TO NOD OFF OR FALL ASLEEP WHILE SITTING INACTIVE IN A PUBLIC PLACE: 0
HOW LIKELY ARE YOU TO NOD OFF OR FALL ASLEEP WHILE SITTING QUIETLY AFTER LUNCH WITHOUT ALCOHOL: 0
HOW LIKELY ARE YOU TO NOD OFF OR FALL ASLEEP WHILE SITTING AND READING: 0
HOW LIKELY ARE YOU TO NOD OFF OR FALL ASLEEP WHILE LYING DOWN TO REST IN THE AFTERNOON WHEN CIRCUMSTANCES PERMIT: 1
ESS TOTAL SCORE: 2
HOW LIKELY ARE YOU TO NOD OFF OR FALL ASLEEP WHILE WATCHING TV: 1
HOW LIKELY ARE YOU TO NOD OFF OR FALL ASLEEP WHILE SITTING AND TALKING TO SOMEONE: 0
HOW LIKELY ARE YOU TO NOD OFF OR FALL ASLEEP WHEN YOU ARE A PASSENGER IN A CAR FOR AN HOUR WITHOUT A BREAK: 0

## 2023-03-16 NOTE — PATIENT INSTRUCTIONS
The company who will be taking care of your CPAP supplies is:     Address: [address] [place] [address] [address], [place], [address] [place] [address]  Phone: [phone] Option #1  Fax: [phone]

## 2023-03-21 ENCOUNTER — OFFICE VISIT (OUTPATIENT)
Dept: CARDIOLOGY CLINIC | Age: 48
End: 2023-03-21

## 2023-03-21 VITALS
HEART RATE: 72 BPM | HEIGHT: 71 IN | BODY MASS INDEX: 29.96 KG/M2 | WEIGHT: 214 LBS | DIASTOLIC BLOOD PRESSURE: 88 MMHG | SYSTOLIC BLOOD PRESSURE: 110 MMHG

## 2023-03-21 DIAGNOSIS — I10 PRIMARY HYPERTENSION: ICD-10-CM

## 2023-03-21 DIAGNOSIS — I20.9 ANGINA PECTORIS (HCC): ICD-10-CM

## 2023-03-21 PROCEDURE — 99024 POSTOP FOLLOW-UP VISIT: CPT | Performed by: INTERNAL MEDICINE

## 2023-03-21 RX ORDER — BISOPROLOL FUMARATE AND HYDROCHLOROTHIAZIDE 5; 6.25 MG/1; MG/1
1 TABLET ORAL DAILY
Qty: 30 TABLET | Refills: 3 | Status: SHIPPED | OUTPATIENT
Start: 2023-03-21

## 2023-03-21 NOTE — PROGRESS NOTES
and oriented to person, place, and time. Psychiatric:         Mood and Affect: Mood normal.         RECENT LABS AND RECORDS REVIEW          ASSESSMENT and PLAN    Diagnoses and all orders for this visit:    Primary hypertension  -     bisoprolol-hydroCHLOROthiazide (ZIAC) 5-6.25 MG per tablet; Take 1 tablet by mouth daily  Doing well. Cont. Current rx  Angina pectoris ([address]  -     bisoprolol-hydroCHLOROthiazide (ZIAC) 5-6.25 MG per tablet; Take 1 tablet by mouth daily   Doing well. Cont. Current rx  Return in about 3 months (around 6/21/2023).        Lakeisha Li MD  3/21/2023  3:52 PM

## 2023-06-27 ENCOUNTER — OFFICE VISIT (OUTPATIENT)
Age: 48
End: 2023-06-27
Payer: COMMERCIAL

## 2023-06-27 VITALS
HEIGHT: 71 IN | HEART RATE: 72 BPM | SYSTOLIC BLOOD PRESSURE: 120 MMHG | WEIGHT: 214 LBS | BODY MASS INDEX: 29.96 KG/M2 | DIASTOLIC BLOOD PRESSURE: 88 MMHG

## 2023-06-27 DIAGNOSIS — I10 PRIMARY HYPERTENSION: Primary | ICD-10-CM

## 2023-06-27 DIAGNOSIS — I20.9 ANGINA PECTORIS (HCC): ICD-10-CM

## 2023-06-27 PROCEDURE — 99213 OFFICE O/P EST LOW 20 MIN: CPT | Performed by: INTERNAL MEDICINE

## 2023-06-27 PROCEDURE — 3074F SYST BP LT 130 MM HG: CPT | Performed by: INTERNAL MEDICINE

## 2023-06-27 PROCEDURE — 3079F DIAST BP 80-89 MM HG: CPT | Performed by: INTERNAL MEDICINE

## 2023-07-19 NOTE — PROGRESS NOTES
change in his AHI was compared to the baseline, and the benefits of treatment were discussed. Usage, with need for compliance to see best benefits, was explained with need to use the device more than 4 hours a night. Usage for more than 6 hours would provide even further benefit and was explained. He is currently at a median use of 7 hours. He has been doing well in this regard. Equipment orders will be renewed as appropriate. Instructions on CPAP usage, proper maintenance and device cleaning were included in the after visit summary today. He should not drive if drowsy or if he has had inadequate sleep. I will see him back in 12 months. Time Spent: Time spent total 20 minutes exclusive of procedures, during the 24 hour period of the date of encounter: preparing to see the patient (e.g. reviewing chart notes, tests), performing a medically appropriate examination and/or evaluation, documenting clinical information in the electronic or other health record, counseling and educating the patient/family/caregiver, obtaining and/or reviewing separately obtained history and ordering medications, tests, procedures. CPAP download was reviewed with the patient in detail today. Adrianna Herrera Is doing well today and getting excellent clinical response from CPAP therapy. I encouraged him to continue utilizing his CPAP machine. Karli Coy MD  Sleep Medicine/Internal Medicine/Pediatrics    The patient is seen by synchronous (real-time) audio-video technology on Doxy or [employer]. Consent:  The patient/healthcare decision maker is aware that this patient-initiated Telehealth encounter is a billable service, with coverage as determined by his insurance carrier. The patient is aware that he/she may receive a bill and has provided verbal consent to proceed: Yes     I was at the office while conducting this visit. We discussed the expected course, resolution and complications of the diagnosis(es) in detail.

## 2023-07-25 ENCOUNTER — TELEMEDICINE (OUTPATIENT)
Dept: SLEEP MEDICINE | Age: 48
End: 2023-07-25
Payer: COMMERCIAL

## 2023-07-25 DIAGNOSIS — G47.33 OSA (OBSTRUCTIVE SLEEP APNEA): Primary | ICD-10-CM

## 2023-07-25 PROCEDURE — 99213 OFFICE O/P EST LOW 20 MIN: CPT | Performed by: INTERNAL MEDICINE

## 2023-07-25 ASSESSMENT — SLEEP AND FATIGUE QUESTIONNAIRES
HOW LIKELY ARE YOU TO NOD OFF OR FALL ASLEEP WHEN YOU ARE A PASSENGER IN A CAR FOR AN HOUR WITHOUT A BREAK: 0
HOW LIKELY ARE YOU TO NOD OFF OR FALL ASLEEP WHILE LYING DOWN TO REST IN THE AFTERNOON WHEN CIRCUMSTANCES PERMIT: 1
HOW LIKELY ARE YOU TO NOD OFF OR FALL ASLEEP WHILE SITTING INACTIVE IN A PUBLIC PLACE: 0
HOW LIKELY ARE YOU TO NOD OFF OR FALL ASLEEP IN A CAR, WHILE STOPPED FOR A FEW MINUTES IN TRAFFIC: 0
ESS TOTAL SCORE: 3
HOW LIKELY ARE YOU TO NOD OFF OR FALL ASLEEP WHILE WATCHING TV: 1
HOW LIKELY ARE YOU TO NOD OFF OR FALL ASLEEP WHILE SITTING AND TALKING TO SOMEONE: 0
HOW LIKELY ARE YOU TO NOD OFF OR FALL ASLEEP WHILE SITTING QUIETLY AFTER LUNCH WITHOUT ALCOHOL: 0
HOW LIKELY ARE YOU TO NOD OFF OR FALL ASLEEP WHILE SITTING AND READING: 1

## 2023-07-25 NOTE — PATIENT INSTRUCTIONS
It was a pleasure meeting you today. Here are some items that we discussed:    1. Your CPAP data looks great, as we mentioned I will change the pressure to a range of 7-10 cm H2O and this will give your machine freedom to go on the higher if you had a few drinks. If ever you have regular snoring while wearing CPAP be sure to let us know. Preethi Lopez MD  [phone]     Please continue to use your CPAPYou are getting a good response with it. To get the best benefits from CPAP therapy you will need to use your device for all periods of sleep, this includes naps. Please do not drive if you are sleepy. I will see you back in 12 months. See your Durable Medical Equipment (DME) provider:  For any mask fit issues and equipment replacement  Use it every time you go to sleep, day or night. Replace your mask cushion, headgear, and filters regularly. Clean your mask daily and the rest every one to two weeks. If any other issues or questions about your use of CPAP, mask, or pressure, arise, please call your medical equipment company. If you are still having issues, please  use [employer] or call to contact our sleep clinic so that we can assist you. If you have issues that are not about your CPAP (example: medication refills or side effects), please call the  at [phone]. Please work on maintaining a healthy weight. A BMI <30 is considered a healthy weight. Current There is no height or weight on file to calculate BMI. . If you are overweight, a loss of 2 pounds per month still comes out to about 25 pounds per year and could allow a reduction in CPAP pressure or even discontinuation of CPAP.         Cleaning instructions      Daily:  Cushion on mask: wash with soap/water OR wipe with an alcohol-free baby wipe    Once per Week:  Mask (frame/headgear): wash with soap/water   Filter: check for cleanliness, Replace (do not wash) monthly, or more often whenever

## 2023-07-25 NOTE — ASSESSMENT & PLAN NOTE
Patient has moderate obstructive sleep apnea, did get his new CPAP machine, download shows excellent control with an AHI less than 2 and no significant leak. He is using and benefiting from therapy and has met compliance criteria. As he does snore if he has a few extra alcoholic drinks will increase his range of pressures from 7->7-10 cm H2O. We will see him back in 1 year, sooner if needed.

## 2023-11-05 DIAGNOSIS — I10 PRIMARY HYPERTENSION: ICD-10-CM

## 2023-11-05 DIAGNOSIS — I20.9 ANGINA PECTORIS (HCC): ICD-10-CM

## 2023-11-05 RX ORDER — BISOPROLOL FUMARATE AND HYDROCHLOROTHIAZIDE 5; 6.25 MG/1; MG/1
1 TABLET ORAL DAILY
Qty: 30 TABLET | Refills: 3 | OUTPATIENT
Start: 2023-11-05

## 2023-11-30 ENCOUNTER — OFFICE VISIT (OUTPATIENT)
Dept: ORTHOPEDIC SURGERY | Age: 48
End: 2023-11-30

## 2023-11-30 DIAGNOSIS — M79.644 THUMB PAIN, RIGHT: Primary | ICD-10-CM

## 2023-11-30 DIAGNOSIS — M65.311 TRIGGER FINGER OF RIGHT THUMB: ICD-10-CM

## 2023-11-30 RX ORDER — MELOXICAM 15 MG/1
15 TABLET ORAL DAILY
Qty: 28 TABLET | Refills: 0 | Status: SHIPPED | OUTPATIENT
Start: 2023-11-30 | End: 2023-12-28

## 2023-11-30 RX ORDER — METHYLPREDNISOLONE ACETATE 40 MG/ML
40 INJECTION, SUSPENSION INTRA-ARTICULAR; INTRALESIONAL; INTRAMUSCULAR; SOFT TISSUE ONCE
Status: COMPLETED | OUTPATIENT
Start: 2023-11-30 | End: 2023-11-30

## 2023-11-30 RX ADMIN — METHYLPREDNISOLONE ACETATE 40 MG: 40 INJECTION, SUSPENSION INTRA-ARTICULAR; INTRALESIONAL; INTRAMUSCULAR; SOFT TISSUE at 14:56

## 2023-11-30 NOTE — PROGRESS NOTES
The patient was prescribed and fitted with teepee thumb brace for the right thumb, size medium. He was instructed to wear the brace at night and as needed during the day. Patient read and signed documenting they understand and agree to [facility]'s current DME return policy.

## 2023-11-30 NOTE — PROGRESS NOTES
Orthopaedic Hand Surgery Note    Name: Oksana Hampton  YOB: 1975  Gender: male  MRN: [id number]    CC: New patient referred for right thumb pain    HPI: Patient is a 51 y.o. male with a chief complaint of right thumb pain. The symptoms have been going on for 2 weeks. He reports he pushed up off his deer stand approximately 2 weeks ago. He denies clicking, popping, locking. He does report paresthesia in the tip of the thumb. He has had carpal tunnel release by Dr. Dagoberto Cabrera in the past..     ROS/Meds/PSH/PMH/FH/SH: I personally reviewed the patients standard intake form. Pertinents are discussed in the HPI    Physical Examination:  Musculoskeletal:   Examination on the right demonstrates Normal sensation to light touch in the median distribution, normal sensation in ulnar and radial distribution, negative carpal tunnel compression testing and Phalen testing. positive tenderness of the right thumb A1 pulley with palpable clicking and negative  locking. The extensor tendons all track well over the MCP joints. Patient does report paresthesia at the tip of the right thumb. Imaging / Electrodiagnostic Tests:     X-rays include a three-view right thumb including a CMC view are reviewed. 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 Assessment:     ICD-10-CM    1. Thumb pain, right  M79.644 XR FINGER RIGHT (MIN 2 VIEWS)     methylPREDNISolone acetate (DEPO-MEDROL) injection 40 mg      2. Trigger finger of right thumb  M65.311 methylPREDNISolone acetate (DEPO-MEDROL) injection 40 mg     Ambulatory Referral to [facility]          Plan:  We discussed the diagnosis and different treatment options. We discussed observation, splinting, cortisone injections and surgical release of the A1 pulley.  We discussed that stenosing tenosynovitis at the level of the A1 pulley AKA trigger finger is a chronic condition regardless of how long the symptoms have been present, this most likely has been progressing for much

## 2024-07-22 NOTE — PROGRESS NOTES
co-pays. This visit was conducted with the patient's (and/or legal guardian's) verbal consent.   The patient was located at Home: [address] [place] [phone].  The provider was located at Facility (Appt Dept):  [facility] [address] [facility] [address] [place] [phone].

## 2024-07-25 ENCOUNTER — TELEMEDICINE (OUTPATIENT)
Dept: SLEEP MEDICINE | Age: 49
End: 2024-07-25
Payer: COMMERCIAL

## 2024-07-25 DIAGNOSIS — G47.33 OSA (OBSTRUCTIVE SLEEP APNEA): Primary | ICD-10-CM

## 2024-07-25 PROCEDURE — G2211 COMPLEX E/M VISIT ADD ON: HCPCS | Performed by: INTERNAL MEDICINE

## 2024-07-25 PROCEDURE — 99213 OFFICE O/P EST LOW 20 MIN: CPT | Performed by: INTERNAL MEDICINE

## 2024-07-25 NOTE — ASSESSMENT & PLAN NOTE
Patient has moderate obstructive sleep apnea, once again download shows excellent control with an AHI less than 2 and no significant leak.  He is using and benefiting from therapy and has met compliance criteria.  We will continue him on 7-10 cm H2O.  Supply order sent to [facility].  We will see him back in 1 year, sooner if needed.

## 2024-07-25 NOTE — PATIENT INSTRUCTIONS
It was a pleasure seeing you today.  Here are some items that we discussed:    1.   Great job using CPAP, I will send supply orders to [facility].  We will follow-up in 1 year      Julian Thorpe MD  [phone]

## 2024-12-31 ENCOUNTER — OFFICE VISIT (OUTPATIENT)
Dept: AUDIOLOGY | Age: 49
End: 2024-12-31
Payer: COMMERCIAL

## 2024-12-31 DIAGNOSIS — H90.3 SENSORINEURAL HEARING LOSS, BILATERAL: Primary | ICD-10-CM

## 2024-12-31 PROCEDURE — 92567 TYMPANOMETRY: CPT | Performed by: AUDIOLOGIST

## 2024-12-31 PROCEDURE — 92557 COMPREHENSIVE HEARING TEST: CPT | Performed by: AUDIOLOGIST
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
## 2025-01-08 ENCOUNTER — OFFICE VISIT (OUTPATIENT)
Dept: ENT CLINIC | Age: 50
End: 2025-01-08
Payer: COMMERCIAL

## 2025-01-08 VITALS
BODY MASS INDEX: 31.33 KG/M2 | HEIGHT: 71 IN | WEIGHT: 223.8 LBS | DIASTOLIC BLOOD PRESSURE: 78 MMHG | SYSTOLIC BLOOD PRESSURE: 118 MMHG

## 2025-01-08 DIAGNOSIS — H93.12 TINNITUS, LEFT: ICD-10-CM

## 2025-01-08 DIAGNOSIS — H90.3 SENSORINEURAL HEARING LOSS (SNHL) OF BOTH EARS: Primary | ICD-10-CM

## 2025-01-08 PROCEDURE — 3078F DIAST BP <80 MM HG: CPT | Performed by: OTOLARYNGOLOGY

## 2025-01-08 PROCEDURE — 3074F SYST BP LT 130 MM HG: CPT | Performed by: OTOLARYNGOLOGY

## 2025-01-08 PROCEDURE — 99204 OFFICE O/P NEW MOD 45 MIN: CPT | Performed by: OTOLARYNGOLOGY

## 2025-01-08 ASSESSMENT — ENCOUNTER SYMPTOMS
GASTROINTESTINAL NEGATIVE: 1
ALLERGIC/IMMUNOLOGIC NEGATIVE: 1
RESPIRATORY NEGATIVE: 1
EYES NEGATIVE: 1

## 2025-01-08 NOTE — PROGRESS NOTES
Chief Complaint   Patient presents with    New Patient     Sudden HL, hearing test done 12/31/2024, when pt was hunting he forgot to turn hearing aids off in left ear        HPI:  Efrem Bonilla is a 49 y.o. male seen today in initial consultation for his ears.  He is a former patient of Dr. Licona and had been worked up back in 2017 with an audiogram which revealed asymmetric left sided SNHL.  He had an MRI scan at that time which was clear.  He has since been fit for hearing aids, and had been at the [facility] about 2 weeks before [place].  He had forgot to turn off his left hearing aid and when he fired, he noted acute change in hearing in the left ear.  His hearing has fortunately returned back to normal, but since then, he has noted an unusual noise in the left ear, which he describes as as a train horn.  This noise is present mainly in the mornings when he yawns and stretches, and is not noticeable at rest.  He denies any otalgia, otorrhea, or ear pressure/fullness    Past Medical History, Past Surgical History, Family history, Social History, and Medications were all reviewed with the patient today and updated as necessary.     No Known Allergies  Patient Active Problem List   Diagnosis    Decreased libido    Thyroid nodule    HTN (hypertension)    High frequency hearing loss    Hyperlipidemia    Low serum testosterone level    ED (erectile dysfunction)    Accelerating angina (HCC)    ADRIENNE (obstructive sleep apnea)    Thumb pain, right    Trigger finger of right thumb     Current Outpatient Medications   Medication Sig    atorvastatin (LIPITOR) 40 MG tablet TAKE 1 TAB BY MOUTH DAILY.    lisinopril (PRINIVIL;ZESTRIL) 20 MG tablet TAKE 1 TAB BY MOUTH DAILY.    pantoprazole (PROTONIX) 40 MG tablet Take 1 tablet by mouth daily     No current facility-administered medications for this visit.     Past Medical History:   Diagnosis Date    Decreased libido 8/19/2013    ED (erectile dysfunction)

## 2025-07-26 ASSESSMENT — SLEEP AND FATIGUE QUESTIONNAIRES
HOW LIKELY ARE YOU TO NOD OFF OR FALL ASLEEP WHEN YOU ARE A PASSENGER IN A CAR FOR AN HOUR WITHOUT A BREAK: WOULD NEVER DOZE
HOW LIKELY ARE YOU TO NOD OFF OR FALL ASLEEP IN A CAR, WHILE STOPPED FOR A FEW MINUTES IN TRAFFIC: WOULD NEVER DOZE
HOW LIKELY ARE YOU TO NOD OFF OR FALL ASLEEP WHILE WATCHING TV: WOULD NEVER DOZE
HOW LIKELY ARE YOU TO NOD OFF OR FALL ASLEEP WHILE SITTING AND TALKING TO SOMEONE: WOULD NEVER DOZE
HOW LIKELY ARE YOU TO NOD OFF OR FALL ASLEEP WHILE WATCHING TV: WOULD NEVER DOZE
HOW LIKELY ARE YOU TO NOD OFF OR FALL ASLEEP IN A CAR, WHILE STOPPED FOR A FEW MINUTES IN TRAFFIC: WOULD NEVER DOZE
ESS TOTAL SCORE: 2
HOW LIKELY ARE YOU TO NOD OFF OR FALL ASLEEP WHILE LYING DOWN TO REST IN THE AFTERNOON WHEN CIRCUMSTANCES PERMIT: SLIGHT CHANCE OF DOZING
HOW LIKELY ARE YOU TO NOD OFF OR FALL ASLEEP WHILE SITTING AND TALKING TO SOMEONE: WOULD NEVER DOZE
HOW LIKELY ARE YOU TO NOD OFF OR FALL ASLEEP WHILE LYING DOWN TO REST IN THE AFTERNOON WHEN CIRCUMSTANCES PERMIT: SLIGHT CHANCE OF DOZING
HOW LIKELY ARE YOU TO NOD OFF OR FALL ASLEEP WHILE SITTING INACTIVE IN A PUBLIC PLACE: WOULD NEVER DOZE
HOW LIKELY ARE YOU TO NOD OFF OR FALL ASLEEP WHILE SITTING AND READING: SLIGHT CHANCE OF DOZING
HOW LIKELY ARE YOU TO NOD OFF OR FALL ASLEEP WHILE SITTING QUIETLY AFTER LUNCH WITHOUT ALCOHOL: WOULD NEVER DOZE
HOW LIKELY ARE YOU TO NOD OFF OR FALL ASLEEP WHILE SITTING AND READING: SLIGHT CHANCE OF DOZING
HOW LIKELY ARE YOU TO NOD OFF OR FALL ASLEEP WHEN YOU ARE A PASSENGER IN A CAR FOR AN HOUR WITHOUT A BREAK: WOULD NEVER DOZE
HOW LIKELY ARE YOU TO NOD OFF OR FALL ASLEEP WHILE SITTING INACTIVE IN A PUBLIC PLACE: WOULD NEVER DOZE
HOW LIKELY ARE YOU TO NOD OFF OR FALL ASLEEP WHILE SITTING QUIETLY AFTER LUNCH WITHOUT ALCOHOL: WOULD NEVER DOZE

## 2025-07-28 NOTE — PROGRESS NOTES
[facility]  [address]  [place] [address] [phone]    Patient Name:  Efrem Bonilla  YOB: 1975    Efrem Bonilla, was evaluated through a synchronous (real-time) audio-video encounter. The patient (or guardian if applicable) is aware that this is a billable service, which includes applicable co-pays. This Virtual Visit was conducted with patient's (and/or legal guardian's) consent. Patient identification was verified, and a caregiver was present when appropriate.   The patient was located at Other: at work, [employer]   Provider was located at Facility (Appt Dept): [address]  [place] [address]  Confirm you are appropriately licensed, registered, or certified to deliver care in the state where the patient is located as indicated above. If you are not or unsure, please re-schedule the visit: Yes, I confirm.          --MAHESH Henderson - CNP on 7/29/2025 at 11:07 AM             Office Visit 7/29/2025    CHIEF COMPLAINT:    Chief Complaint   Patient presents with    CPAP/BiPAP    Sleep Apnea       HISTORY OF PRESENT ILLNESS:  Patient is being seen today via virtual visit.  Patient was diagnosed with moderate obstructive sleep apnea in 2018 with an AHI of 18 events per hour with desaturations to 78%.  He is prescribed auto CPAP 7 to 10 cm.  Download reveals 90% compliance over the past year with average nightly use 7 hours and 8 minutes.  AHI is 1.6 events per hour with average pressure use 9.1 to 10 cm.  He is using nasal pillows and is happy with it.  Denies any issues with the pressure.  He denies any daytime sleepiness or napping and states that he sleeps well at night.  Denies any significant medical changes with a current weight of 225 pounds.  Denies any lower extremity edema.    Download        [place] Scale         7/26/2025     2:22 PM 7/22/2024     2:04 PM 7/25/2023     7:52 AM 3/16/2023     7:55 AM   Sleep

## 2025-07-29 ENCOUNTER — TELEMEDICINE (OUTPATIENT)
Dept: SLEEP MEDICINE | Age: 50
End: 2025-07-29
Payer: COMMERCIAL

## 2025-07-29 DIAGNOSIS — G47.33 OSA (OBSTRUCTIVE SLEEP APNEA): Primary | ICD-10-CM

## 2025-07-29 PROCEDURE — 99213 OFFICE O/P EST LOW 20 MIN: CPT | Performed by: NURSE PRACTITIONER

## 2025-07-29 RX ORDER — TADALAFIL 20 MG/1
20 TABLET ORAL PRN
COMMUNITY
Start: 2025-06-12

## 2025-07-29 RX ORDER — FENOFIBRATE 160 MG/1
160 TABLET ORAL DAILY
COMMUNITY
Start: 2025-01-14

## 2025-07-29 NOTE — PATIENT INSTRUCTIONS
The company who will be taking care of your CPAP supplies is:    Address: [address], [place] [address]  Phone: [phone]  do not click on any options, wait for live person to answer the phone.   Fax: [phone]

## (undated) DEVICE — (D)PREP SKN CHLRAPRP APPL 26ML -- CONVERT TO ITEM 371833

## (undated) DEVICE — BANDAGE,ELASTIC,ESMARK,STERILE,4"X9',LF: Brand: MEDLINE

## (undated) DEVICE — BAND COMPR L24CM REG CLR PLAS HEMSTAT EXT HK AND LOOP RETEN

## (undated) DEVICE — APPLICATOR COT-TIP 6IN WOOD -- 2/PK STRL

## (undated) DEVICE — DRAPE,HAND,STERILE: Brand: MEDLINE

## (undated) DEVICE — HAND PACK: Brand: MEDLINE INDUSTRIES, INC.

## (undated) DEVICE — SOLUTION IV 1000ML 0.9% SOD CHL

## (undated) DEVICE — SYR 10ML LUER LOK 1/5ML GRAD --

## (undated) DEVICE — PADDING CAST W2INXL4YD ST COT COHESIVE HND TEARABLE SPEC

## (undated) DEVICE — AMD ANTIMICROBIAL GAUZE SPONGES,12 PLY USP TYPE VII, 0.2% POLYHEXAMETHYLENE BIGUANIDE HCI (PHMB): Brand: CURITY

## (undated) DEVICE — STERILE HOOK LOCK LATEX FREE ELASTIC BANDAGE 2INX5YD: Brand: HOOK LOCK™

## (undated) DEVICE — ZIMMER® STERILE DISPOSABLE TOURNIQUET CUFF WITH PLC, DUAL PORT, SINGLE BLADDER, 18 IN. (46 CM)

## (undated) DEVICE — RETROGRADE KNIFE BOX OF 6: Brand: ECTRA

## (undated) DEVICE — GUIDEWIRE 035IN 210CM PTFE COAT FIX COR J TIP 15MM FIRM BODY

## (undated) DEVICE — SUTURE NONABSORBABLE MONOFILAMENT 4-0 PS-2 18 IN BLU PROLENE 8682H

## (undated) DEVICE — NEEDLE HYPO 25GA L1.5IN BLU POLYPR HUB S STL REG BVL STR

## (undated) DEVICE — DRAPE, FILM SHEET, 44X65 STERILE: Brand: MEDLINE

## (undated) DEVICE — CATHETER DIAG 6FR L100CM GWIRE 0.038IN S STL POLYUR MP W/ 2

## (undated) DEVICE — SYRINGE EAR 2OZ ULC SLIMMER TIP FLAT BTM SUCT PWR DISP FOR

## (undated) DEVICE — GLIDESHEATH SLENDER STAINLESS STEEL KIT: Brand: GLIDESHEATH SLENDER

## (undated) DEVICE — DERMABOND SKIN ADH 0.7ML -- DERMABOND ADVANCED 12/BX